# Patient Record
Sex: MALE | Race: WHITE | Employment: UNEMPLOYED | URBAN - NONMETROPOLITAN AREA
[De-identification: names, ages, dates, MRNs, and addresses within clinical notes are randomized per-mention and may not be internally consistent; named-entity substitution may affect disease eponyms.]

---

## 2024-03-08 ENCOUNTER — TELEPHONE (OUTPATIENT)
Dept: INTERNAL MEDICINE | Age: 39
End: 2024-03-08

## 2024-03-08 PROBLEM — F10.10 ALCOHOL ABUSE: Status: ACTIVE | Noted: 2021-02-26

## 2024-03-08 PROBLEM — I10 ESSENTIAL HYPERTENSION: Status: ACTIVE | Noted: 2021-02-26

## 2024-03-08 PROBLEM — R74.8 ELEVATED LIVER ENZYMES: Status: ACTIVE | Noted: 2021-02-26

## 2024-03-08 PROBLEM — F41.1 GENERALIZED ANXIETY DISORDER: Status: ACTIVE | Noted: 2021-03-03

## 2024-03-08 PROBLEM — F15.10 METHAMPHETAMINE ABUSE (HCC): Status: ACTIVE | Noted: 2021-03-03

## 2025-02-08 ENCOUNTER — OFFICE VISIT (OUTPATIENT)
Age: 40
End: 2025-02-08

## 2025-02-08 VITALS
HEIGHT: 71 IN | WEIGHT: 185.2 LBS | SYSTOLIC BLOOD PRESSURE: 122 MMHG | HEART RATE: 66 BPM | DIASTOLIC BLOOD PRESSURE: 76 MMHG | OXYGEN SATURATION: 97 % | TEMPERATURE: 97 F | RESPIRATION RATE: 20 BRPM | BODY MASS INDEX: 25.93 KG/M2

## 2025-02-08 DIAGNOSIS — K52.9 GASTROENTERITIS: Primary | ICD-10-CM

## 2025-02-08 DIAGNOSIS — R11.2 NAUSEA, VOMITING, AND DIARRHEA: ICD-10-CM

## 2025-02-08 DIAGNOSIS — R19.7 NAUSEA, VOMITING, AND DIARRHEA: ICD-10-CM

## 2025-02-08 DIAGNOSIS — M54.41 ACUTE BILATERAL LOW BACK PAIN WITH BILATERAL SCIATICA: ICD-10-CM

## 2025-02-08 DIAGNOSIS — M54.42 ACUTE BILATERAL LOW BACK PAIN WITH BILATERAL SCIATICA: ICD-10-CM

## 2025-02-08 LAB
INFLUENZA A ANTIBODY: NORMAL
INFLUENZA B ANTIBODY: NORMAL

## 2025-02-08 RX ORDER — ONDANSETRON 4 MG/1
4 TABLET, ORALLY DISINTEGRATING ORAL 3 TIMES DAILY PRN
Qty: 21 TABLET | Refills: 0 | Status: SHIPPED | OUTPATIENT
Start: 2025-02-08

## 2025-02-08 RX ORDER — EZETIMIBE 10 MG/1
10 TABLET ORAL DAILY
COMMUNITY

## 2025-02-08 RX ORDER — LAMOTRIGINE 150 MG/1
150 TABLET ORAL 2 TIMES DAILY
COMMUNITY
Start: 2025-02-05

## 2025-02-08 RX ORDER — CYCLOBENZAPRINE HCL 10 MG
5 TABLET ORAL 3 TIMES DAILY PRN
Qty: 21 TABLET | Refills: 0 | Status: SHIPPED | OUTPATIENT
Start: 2025-02-08 | End: 2025-02-22

## 2025-02-08 RX ORDER — METOPROLOL SUCCINATE 50 MG/1
50 TABLET, EXTENDED RELEASE ORAL DAILY
COMMUNITY
Start: 2025-02-06

## 2025-02-08 RX ORDER — PREDNISONE 20 MG/1
20 TABLET ORAL DAILY
Qty: 5 TABLET | Refills: 0 | Status: SHIPPED | OUTPATIENT
Start: 2025-02-08 | End: 2025-02-13

## 2025-02-08 RX ORDER — PANTOPRAZOLE SODIUM 20 MG/1
20 TABLET, DELAYED RELEASE ORAL 2 TIMES DAILY
COMMUNITY
Start: 2025-02-05

## 2025-02-08 RX ORDER — BUPRENORPHINE HYDROCHLORIDE AND NALOXONE HYDROCHLORIDE DIHYDRATE 8; 2 MG/1; MG/1
TABLET SUBLINGUAL
COMMUNITY
Start: 2025-02-06

## 2025-02-08 RX ORDER — BUPROPION HYDROCHLORIDE 150 MG/1
150 TABLET ORAL ONCE
COMMUNITY
Start: 2025-02-08

## 2025-02-08 RX ORDER — IBUPROFEN 600 MG/1
600 TABLET, FILM COATED ORAL 3 TIMES DAILY PRN
Qty: 30 TABLET | Refills: 0 | Status: SHIPPED | OUTPATIENT
Start: 2025-02-08

## 2025-02-08 RX ORDER — ALPRAZOLAM 1 MG/1
1 TABLET ORAL 2 TIMES DAILY
COMMUNITY
Start: 2025-01-30

## 2025-02-08 ASSESSMENT — ENCOUNTER SYMPTOMS
SORE THROAT: 0
ABDOMINAL PAIN: 0
DIARRHEA: 1
BACK PAIN: 1
ABDOMINAL DISTENTION: 0
CHEST TIGHTNESS: 0
COLOR CHANGE: 0
SHORTNESS OF BREATH: 0
NAUSEA: 1
EYE ITCHING: 0
EYE DISCHARGE: 0
TROUBLE SWALLOWING: 0
WHEEZING: 0
APNEA: 0
RHINORRHEA: 0
CONSTIPATION: 0
COUGH: 0
EYE PAIN: 0
SINUS PRESSURE: 0
VOMITING: 1
SINUS PAIN: 0

## 2025-02-08 NOTE — PATIENT INSTRUCTIONS
Flu negative.   Take Zofran as prescribed.   Take cyclobenzaprine as prescribed for muscle spasms.  Take prednisone as prescribed.  Take ibuprofen as prescribed.  May take Tylenol/Ibuprofen to treat fever/pain, if not contraindicated.   Increase oral fluid intake as tolerated.   [place], BRAT diet as tolerated (bananas, rice, applesauce, toast).   Follow-up in ER or with PCP if unable to keep food/fluid down x 24 hours, dizziness, fevers, and worsening or persistent symptoms.  The patient is to follow up with PCP or return to clinic if symptoms worsen/fail to improve.

## 2025-02-08 NOTE — PROGRESS NOTES
[facility]  [facility]  [address] [place] [address] [place] [address]  Dept: [phone]  Dept Fax: [phone]  Loc: [phone]    Flo Campbell is a 40 y.o. male who presents today for his medical conditions/complaints as noted below.      HPI:     Patient presents for evaluation of nausea, vomiting, and diarrhea that started yesterday. Has used antidiarrheals OTC with some relief. Denies fevers. Reports his kids are sick with flu-like symptoms. Has been able to hold down some food and fluids today. Is requesting Flu testing.     Patient also reports bilateral lower back pain and tightness. Reports the pain radiates down both of his legs. Has used ibuprofen over-the-counter with some relief. Reports he has used Flexeril in the past with some relief of this. Denies injury.     No past medical history on file.    No past surgical history on file.    No family history on file.    Social History     Tobacco Use    Smoking status: Never    Smokeless tobacco: Never   Substance Use Topics    Alcohol use: Never        Current Outpatient Medications   Medication Sig Dispense Refill    ALPRAZolam (XANAX) 1 MG tablet Take 1 tablet by mouth 2 times daily.      buPROPion (WELLBUTRIN XL) 150 MG extended release tablet Take 1 tablet by mouth once      buprenorphine-naloxone (SUBOXONE) 8-2 MG SUBL SL tablet DISSOLVE 2 TABLETS UNDER THE TONGUE DAILY      ezetimibe (ZETIA) 10 MG tablet Take 1 tablet by mouth daily      lamoTRIgine (LAMICTAL) 150 MG tablet Take 1 tablet by mouth 2 times daily      metoprolol succinate (TOPROL XL) 50 MG extended release tablet Take 1 tablet by mouth daily      pantoprazole (PROTONIX) 20 MG tablet Take 1 tablet by mouth 2 times daily      cyclobenzaprine (FLEXERIL) 10 MG tablet Take 0.5 tablets by mouth 3 times daily as needed for Muscle spasms 21 tablet 0    ibuprofen (ADVIL;MOTRIN) 600 MG tablet Take 1 tablet by mouth 3 times daily as needed for

## 2025-02-14 ENCOUNTER — OFFICE VISIT (OUTPATIENT)
Age: 40
End: 2025-02-14

## 2025-02-14 VITALS
BODY MASS INDEX: 26.22 KG/M2 | HEART RATE: 94 BPM | TEMPERATURE: 99.2 F | WEIGHT: 188 LBS | OXYGEN SATURATION: 96 % | SYSTOLIC BLOOD PRESSURE: 124 MMHG | DIASTOLIC BLOOD PRESSURE: 62 MMHG | RESPIRATION RATE: 22 BRPM

## 2025-02-14 DIAGNOSIS — R50.9 FEVER, UNSPECIFIED FEVER CAUSE: ICD-10-CM

## 2025-02-14 DIAGNOSIS — R52 BODY ACHES: ICD-10-CM

## 2025-02-14 DIAGNOSIS — J02.9 SORE THROAT: ICD-10-CM

## 2025-02-14 DIAGNOSIS — R09.81 SINUS CONGESTION: ICD-10-CM

## 2025-02-14 DIAGNOSIS — R05.1 ACUTE COUGH: ICD-10-CM

## 2025-02-14 DIAGNOSIS — J10.1 INFLUENZA DUE TO INFLUENZA VIRUS, TYPE A, HUMAN: Primary | ICD-10-CM

## 2025-02-14 LAB
INFLUENZA A ANTIBODY: ABNORMAL
INFLUENZA B ANTIBODY: ABNORMAL
Lab: NORMAL
QC PASS/FAIL: NORMAL
S PYO AG THROAT QL: NORMAL
SARS-COV-2, POC: NORMAL

## 2025-02-14 RX ORDER — BROMPHENIRAMINE MALEATE, PSEUDOEPHEDRINE HYDROCHLORIDE, AND DEXTROMETHORPHAN HYDROBROMIDE 2; 30; 10 MG/5ML; MG/5ML; MG/5ML
5-10 SYRUP ORAL 4 TIMES DAILY PRN
Qty: 200 ML | Refills: 0 | Status: SHIPPED | OUTPATIENT
Start: 2025-02-14 | End: 2025-02-19

## 2025-02-14 ASSESSMENT — ENCOUNTER SYMPTOMS
SORE THROAT: 1
COLOR CHANGE: 0
WHEEZING: 0
TROUBLE SWALLOWING: 0
NAUSEA: 0
VOMITING: 0
ABDOMINAL PAIN: 0
APNEA: 0
ABDOMINAL DISTENTION: 0
STRIDOR: 0
SINUS PAIN: 0
EYE DISCHARGE: 0
SINUS PRESSURE: 1
FACIAL SWELLING: 0
DIARRHEA: 0
SHORTNESS OF BREATH: 0
COUGH: 1
EYE PAIN: 0
CHEST TIGHTNESS: 0
CHOKING: 0
EYE REDNESS: 0
CONSTIPATION: 0

## 2025-02-14 NOTE — PROGRESS NOTES
flonase  - Take OTC motrin/tylenol for fevers/body aches    Stay home until at least 24 hours fever free without medications.     Monitor for signs of dehydration: decreased urine output, dark urine, feeling weak or dizzy, and go to the ER if these occur.     The patient is to follow up with PCP or return to clinic if symptoms worsen/fail to improve.    Work note provided. May return on Thursday.     Any condition can change, despite proper treatment. Therefore, if symptoms still persist or worsen after treatment plan intitated today, either go to the nearest ER, or call PCP, or return to  for further evaluation.    Urgent Care evaluation today is not a substitute for PCP visit. Follow up care is your responsibility to discuss and review this UC visit.    Discussed use, benefits, and side effects of any prescribed medications. All patient questions were answered. Patient demonstrates understanding and agrees with care plan. Patient was given educational materials - see patient instructions below     Orders Placed This Encounter   Procedures    POCT rapid strep A    POCT COVID-19 Antigen Card     Order Specific Question:   Pregnant:     Answer:   No    POCT Influenza A/B       Results for orders placed or performed in visit on 02/14/25   POCT rapid strep A   Result Value Ref Range    Strep A Ag None Detected None Detected   POCT COVID-19 Antigen Card   Result Value Ref Range    SARS-COV-2, POC Not-Detected Not Detected    Lot Number [id number]     QC Pass/Fail pass    POCT Influenza A/B   Result Value Ref Range    Influenza A Ab POS     Influenza B Ab NEG        Orders Placed This Encounter   Medications    brompheniramine-pseudoephedrine-DM 2-30-10 MG/5ML syrup     Sig: Take 5-10 mLs by mouth 4 times daily as needed for Cough     Dispense:  200 mL     Refill:  0      New Prescriptions    BROMPHENIRAMINE-PSEUDOEPHEDRINE-DM 2-30-10 MG/5ML SYRUP    Take 5-10 mLs by mouth 4 times daily as needed for Cough        Return if

## 2025-02-14 NOTE — PATIENT INSTRUCTIONS
Flu A  Flu test was positive for type A    Strep and COVID tests are negative    Bromfed as needed for cough    Recommended supportive care:  - Increase fluid intake  - Encouraged adequate rest  - Recommended OTC claritin or zyrtec and flonase  - Take OTC motrin/tylenol for fevers/body aches    Stay home until at least 24 hours fever free without medications.     Monitor for signs of dehydration: decreased urine output, dark urine, feeling weak or dizzy, and go to the ER if these occur.     The patient is to follow up with PCP or return to clinic if symptoms worsen/fail to improve.    Work note provided. May return on Thursday.     Any condition can change, despite proper treatment. Therefore, if symptoms still persist or worsen after treatment plan intitated today, either go to the nearest ER, or call PCP, or return to UC for further evaluation.    Urgent Care evaluation today is not a substitute for PCP visit. Follow up care is your responsibility to discuss and review this UC visit.